# Patient Record
Sex: MALE | Race: BLACK OR AFRICAN AMERICAN | NOT HISPANIC OR LATINO | URBAN - METROPOLITAN AREA
[De-identification: names, ages, dates, MRNs, and addresses within clinical notes are randomized per-mention and may not be internally consistent; named-entity substitution may affect disease eponyms.]

---

## 2024-06-05 ENCOUNTER — HOSPITAL ENCOUNTER (OUTPATIENT)
Dept: RADIOLOGY | Facility: HOSPITAL | Age: 64
Discharge: HOME | End: 2024-06-05
Payer: COMMERCIAL

## 2024-06-05 DIAGNOSIS — M79.674 PAIN IN TOE OF RIGHT FOOT: ICD-10-CM

## 2024-06-05 PROCEDURE — 73630 X-RAY EXAM OF FOOT: CPT | Mod: RT

## 2024-06-05 PROCEDURE — 73630 X-RAY EXAM OF FOOT: CPT | Mod: RIGHT SIDE

## 2024-06-17 ENCOUNTER — APPOINTMENT (OUTPATIENT)
Dept: ORTHOPEDIC SURGERY | Facility: CLINIC | Age: 64
End: 2024-06-17
Payer: COMMERCIAL

## 2024-07-01 ENCOUNTER — APPOINTMENT (OUTPATIENT)
Dept: ORTHOPEDIC SURGERY | Facility: CLINIC | Age: 64
End: 2024-07-01
Payer: COMMERCIAL

## 2024-07-01 ENCOUNTER — APPOINTMENT (OUTPATIENT)
Dept: RADIOLOGY | Facility: CLINIC | Age: 64
End: 2024-07-01
Payer: COMMERCIAL

## 2024-07-01 DIAGNOSIS — S92.501A CLOSED FRACTURE OF FOURTH TOE OF RIGHT FOOT, INITIAL ENCOUNTER: ICD-10-CM

## 2024-07-25 ENCOUNTER — HOSPITAL ENCOUNTER (OUTPATIENT)
Dept: RADIOLOGY | Facility: CLINIC | Age: 64
Discharge: HOME | End: 2024-07-25
Payer: COMMERCIAL

## 2024-07-25 ENCOUNTER — OFFICE VISIT (OUTPATIENT)
Dept: ORTHOPEDIC SURGERY | Facility: CLINIC | Age: 64
End: 2024-07-25
Payer: COMMERCIAL

## 2024-07-25 DIAGNOSIS — S92.514A CLOSED NONDISPLACED FRACTURE OF PROXIMAL PHALANX OF LESSER TOE OF RIGHT FOOT, INITIAL ENCOUNTER: Primary | ICD-10-CM

## 2024-07-25 DIAGNOSIS — M79.671 RIGHT FOOT PAIN: ICD-10-CM

## 2024-07-25 DIAGNOSIS — M79.674 PAIN IN TOE OF RIGHT FOOT: ICD-10-CM

## 2024-07-25 PROCEDURE — 1159F MED LIST DOCD IN RCRD: CPT | Performed by: ORTHOPAEDIC SURGERY

## 2024-07-25 PROCEDURE — 73630 X-RAY EXAM OF FOOT: CPT | Mod: RT

## 2024-07-25 PROCEDURE — 1160F RVW MEDS BY RX/DR IN RCRD: CPT | Performed by: ORTHOPAEDIC SURGERY

## 2024-07-25 PROCEDURE — 99214 OFFICE O/P EST MOD 30 MIN: CPT | Performed by: ORTHOPAEDIC SURGERY

## 2024-07-25 PROCEDURE — 99204 OFFICE O/P NEW MOD 45 MIN: CPT | Performed by: ORTHOPAEDIC SURGERY

## 2024-07-25 NOTE — PROGRESS NOTES
This 64-year-old gentleman complains of a right foot injury that occurred on June 5, 2024.  The patient stubbed his toe on a treadmill.  The patient denies any previous injuries to the toe and is unsure as to whether he had any neurologic symptoms at the time of the injury but denies any at present.  The patient went to urgent care and was placed in a postop shoe.  He notes his toe pain is much better still has some swelling of the fourth toe and of the second toe.    Patient's situation is further complicated by his past medical history of diabetes for which he takes metformin.    Review of systems:  A complete review of the patient's past medical history and review of 30 systems and all medications and allergies was performed. Please see adult medical record for details.    A Family history for genetic or familial inheritance issues and Social history including smoking history, alcohol consumption and exercise activities was also reviewed and significant findings noted in the patients history of present illness.    Physical Exam:  The patient is well-nourished and well-developed and in no acute distress. The patient displayed normal mood and affect.  Patient's respirations appear to be regular and unlabored.  Examination of the right foot reveals slight swelling of the fourth toe and second toe.  There is generalized discomfort to palpation of the second toe and slight discomfort over palpation of the proximal phalanx of the fourth toe.  There is no erythema no skin breakdown no drainage.  Subtalar midtarsal motion is unremarkable.  The calf is soft and nontender and without swelling.  The neurovascular exam is intact.  Good alignment is noted.    Imaging:  I personally reviewed and measured the radiographs including AP and lateral views of the extremity and they revealed healing fracture of the proximal phalanx of the fourth toe and a possible nondisplaced fracture of the proximal phalanx of the second.  Good  alignment is noted.      Impression & Plan:   It is my impression this patient has sustained a fracture of the proximal phalanx of the fourth toe and a possible fracture of the proximal phalanx of the second toe.  Both are nondisplaced appear to be healing in good alignment.  The patient should continue to wear good support shoe and I advised him not to go barefoot in order to protect his feet.  I suspect the swelling and discomfort will gradually resolve.    I would be delighted to see the patient back the future should  they  have further problems.    Note dictated with [email or web address] software.  Completed without full type editing and sent to avoid delay.

## 2024-07-25 NOTE — LETTER
July 25, 2024     Elvia Lakhani, APRN-CNP  [id number] [facility] [id number]    Patient: Clifford Phoenix   YOB: 1960   Date of Visit: 7/25/2024       Dear Dr. Elvia Lakhani, APRN-CNP:    Thank you for referring Clifford Phoenix to me for evaluation. Below are my notes for this consultation.  If you have questions, please do not hesitate to call me. I look forward to following your patient along with you.       Sincerely,     Adam Marquez MD      CC: Aviva Kennedy PA-C  ______________________________________________________________________________________    This 64-year-old gentleman complains of a right foot injury that occurred on June 5, 2024.  The patient stubbed his toe on a treadmill.  The patient denies any previous injuries to the toe and is unsure as to whether he had any neurologic symptoms at the time of the injury but denies any at present.  The patient went to urgent care and was placed in a postop shoe.  He notes his toe pain is much better still has some swelling of the fourth toe and of the second toe.    Patient's situation is further complicated by his past medical history of diabetes for which he takes metformin.    Review of systems:  A complete review of the patient's past medical history and review of 30 systems and all medications and allergies was performed. Please see adult medical record for details.    A Family history for genetic or familial inheritance issues and Social history including smoking history, alcohol consumption and exercise activities was also reviewed and significant findings noted in the patients history of present illness.    Physical Exam:  The patient is well-nourished and well-developed and in no acute distress. The patient displayed normal mood and affect.  Patient's respirations appear to be regular and unlabored.  Examination of the right foot reveals slight swelling of the fourth toe and second toe.  There is  generalized discomfort to palpation of the second toe and slight discomfort over palpation of the proximal phalanx of the fourth toe.  There is no erythema no skin breakdown no drainage.  Subtalar midtarsal motion is unremarkable.  The calf is soft and nontender and without swelling.  The neurovascular exam is intact.  Good alignment is noted.    Imaging:  I personally reviewed and measured the radiographs including AP and lateral views of the extremity and they revealed healing fracture of the proximal phalanx of the fourth toe and a possible nondisplaced fracture of the proximal phalanx of the second.  Good alignment is noted.      Impression & Plan:   It is my impression this patient has sustained a fracture of the proximal phalanx of the fourth toe and a possible fracture of the proximal phalanx of the second toe.  Both are nondisplaced appear to be healing in good alignment.  The patient should continue to wear good support shoe and I advised him not to go barefoot in order to protect his feet.  I suspect the swelling and discomfort will gradually resolve.    I would be delighted to see the patient back the future should  they  have further problems.    Note dictated with [employer] software.  Completed without full type editing and sent to avoid delay.